# Patient Record
Sex: FEMALE | Race: WHITE | NOT HISPANIC OR LATINO | Employment: FULL TIME | URBAN - METROPOLITAN AREA
[De-identification: names, ages, dates, MRNs, and addresses within clinical notes are randomized per-mention and may not be internally consistent; named-entity substitution may affect disease eponyms.]

---

## 2018-02-04 ENCOUNTER — OFFICE VISIT (OUTPATIENT)
Dept: URGENT CARE | Facility: CLINIC | Age: 43
End: 2018-02-04
Payer: COMMERCIAL

## 2018-02-04 VITALS
DIASTOLIC BLOOD PRESSURE: 72 MMHG | HEART RATE: 87 BPM | TEMPERATURE: 98 F | SYSTOLIC BLOOD PRESSURE: 139 MMHG | OXYGEN SATURATION: 96 % | RESPIRATION RATE: 18 BRPM

## 2018-02-04 DIAGNOSIS — H66.93 BILATERAL OTITIS MEDIA, UNSPECIFIED OTITIS MEDIA TYPE: Primary | ICD-10-CM

## 2018-02-04 DIAGNOSIS — J01.90 ACUTE SINUSITIS, RECURRENCE NOT SPECIFIED, UNSPECIFIED LOCATION: ICD-10-CM

## 2018-02-04 PROCEDURE — 99203 OFFICE O/P NEW LOW 30 MIN: CPT | Performed by: NURSE PRACTITIONER

## 2018-02-04 PROCEDURE — 99213 OFFICE O/P EST LOW 20 MIN: CPT | Performed by: NURSE PRACTITIONER

## 2018-02-04 RX ORDER — AMOXICILLIN AND CLAVULANATE POTASSIUM 875; 125 MG/1; MG/1
1 TABLET, FILM COATED ORAL 2 TIMES DAILY
Qty: 14 TABLET | Refills: 0 | Status: SHIPPED | OUTPATIENT
Start: 2018-02-04 | End: 2018-02-11

## 2018-02-04 NOTE — PROGRESS NOTES
Assessment/Plan     Diagnoses and all orders for this visit:    Bilateral otitis media, unspecified otitis media type  -     amoxicillin-clavulanate (AUGMENTIN) 875-125 mg per tablet; Take 1 tablet by mouth 2 (two) times a day for 7 days    Acute sinusitis, recurrence not specified, unspecified location  -     amoxicillin-clavulanate (AUGMENTIN) 875-125 mg per tablet; Take 1 tablet by mouth 2 (two) times a day for 7 days              Subjective:     Patient ID: Ambrose Long is a 38 y o  female  Chief Complaint:    Earache    There is pain in the left ear  Episode onset: 4 days  The problem has been gradually worsening  There has been no fever  Associated symptoms include coughing, headaches and rhinorrhea  Pertinent negatives include no sore throat  No past medical history on file  No past surgical history on file  No family history on file  Review of Systems   Constitutional: Negative  HENT: Positive for congestion, ear pain and rhinorrhea  Negative for sore throat  Eyes: Negative  Respiratory: Positive for cough  Cardiovascular: Negative  Gastrointestinal: Negative  Genitourinary: Negative  Musculoskeletal: Negative  Skin: Negative  Neurological: Positive for headaches  Objective:    /72   Pulse 87   Temp 98 °F (36 7 °C)   Resp 18   SpO2 96%     Physical Exam   Constitutional: She is oriented to person, place, and time  She appears well-developed and well-nourished  No distress  HENT:   Head: Normocephalic and atraumatic  Right Ear: Tympanic membrane is erythematous and bulging  Left Ear: Tympanic membrane is erythematous and bulging  Nose: Mucosal edema and rhinorrhea present  Mouth/Throat: No oropharyngeal exudate or posterior oropharyngeal erythema  Eyes: Conjunctivae and EOM are normal  Pupils are equal, round, and reactive to light  Neck: Normal range of motion  Neck supple     Cardiovascular: Normal rate, regular rhythm and normal heart sounds  Pulmonary/Chest: Effort normal and breath sounds normal  She has no wheezes (RLL   Mild expiratory  )  Abdominal: Soft  Bowel sounds are normal    Lymphadenopathy:     She has no cervical adenopathy  Neurological: She is alert and oriented to person, place, and time  She has normal reflexes  Skin: Skin is warm and dry  No rash noted  Psychiatric: She has a normal mood and affect  Nursing note and vitals reviewed  Patient Instructions   Follow up with PCP in 48-72 hours if no improvement or if symptoms worsen

## 2021-01-28 ENCOUNTER — APPOINTMENT (EMERGENCY)
Dept: RADIOLOGY | Facility: HOSPITAL | Age: 46
End: 2021-01-28
Payer: OTHER MISCELLANEOUS

## 2021-01-28 ENCOUNTER — HOSPITAL ENCOUNTER (EMERGENCY)
Facility: HOSPITAL | Age: 46
Discharge: HOME | End: 2021-01-28
Attending: EMERGENCY MEDICINE
Payer: OTHER MISCELLANEOUS

## 2021-01-28 VITALS
RESPIRATION RATE: 18 BRPM | WEIGHT: 190 LBS | BODY MASS INDEX: 28.79 KG/M2 | HEART RATE: 80 BPM | SYSTOLIC BLOOD PRESSURE: 142 MMHG | HEIGHT: 68 IN | TEMPERATURE: 97.9 F | DIASTOLIC BLOOD PRESSURE: 88 MMHG | OXYGEN SATURATION: 98 %

## 2021-01-28 DIAGNOSIS — M25.571 ACUTE RIGHT ANKLE PAIN: Primary | ICD-10-CM

## 2021-01-28 PROCEDURE — 73610 X-RAY EXAM OF ANKLE: CPT

## 2021-01-28 PROCEDURE — 99283 EMERGENCY DEPT VISIT LOW MDM: CPT

## 2021-01-28 PROCEDURE — 99284 EMERGENCY DEPT VISIT MOD MDM: CPT | Performed by: EMERGENCY MEDICINE

## 2021-01-28 NOTE — ED ATTENDING ATTESTATION
1/28/2021  IMary Ann MD, saw and evaluated the patient  I have discussed the patient with the resident/non-physician practitioner and agree with the resident's/non-physician practitioner's findings, Plan of Care, and MDM as documented in the resident's/non-physician practitioner's note, except where noted  All available labs and Radiology studies were reviewed  I was present for key portions of any procedure(s) performed by the resident/non-physician practitioner and I was immediately available to provide assistance  At this point I agree with the current assessment done in the Emergency Department  I have conducted an independent evaluation of this patient a history and physical is as follows:    ED Course         Critical Care Time  Procedures    38 yo female with no pmh, had a fall today while trying to catch the ups betty and inverted right foot on steps  No other trauma, no other injury  Pt able to ambulate  Pt with swelling of foot  No loc  No previous injury to foot  Vss, afebrile, lungs cta, rrr, abdomen soft nontender, right lateral malleolar tenderness, nvi  Pt able to bear weight  Xray   Likely sprain, air cast

## 2021-01-28 NOTE — DISCHARGE INSTRUCTIONS
RICE therapy: Rest, Ice, Compression, and Elevation  Rest and elevate the affected painful area  Apply cold compresses intermittently as needed  You may apply a compressive ACE bandage  As pain recedes, slowly resume normal activities as tolerated  You may take ibuprofen (Motrin, Aleve) 400-600 mg and acetaminophen (Tylenol) 650 mg every 6-8 hours as needed for pain  Please follow up with a primary care provider

## 2021-01-29 NOTE — ED PROVIDER NOTES
History  Chief Complaint   Patient presents with    Ankle Injury     Patient reports right ankle pain after tripping and falling today; states that she did not hit her head or LOC; feels like the ankle is swelling but is able to ambualte at this time      44-year-old female no significant past medical history presents with right ankle pain  Patient works for [employer], states that she had mechanical ground level fall where she tripped over concrete divit  There was no prodrome prior to the fall  Twisted her right ankle with inversion on the way down fell to her knees without head strike  Felt immediate pain at the ankle and noted swelling not long after  She came in for evaluation to make sure that she did not sustain a fracture  The pain is at the lateral aspect of the right ankle, worse with contact and movement however she has been able to bear weight without difficulty  Denies any focal deficits, any numbness or tingling  Denies any history of hardware in the ankle  Does not take any blood thinners  None       History reviewed  No pertinent past medical history  History reviewed  No pertinent surgical history  History reviewed  No pertinent family history  I have reviewed and agree with the history as documented  E-Cigarette/Vaping    E-Cigarette Use Never User      E-Cigarette/Vaping Substances     Social History     Tobacco Use    Smoking status: Never Smoker    Smokeless tobacco: Never Used   Substance Use Topics    Alcohol use: Never     Frequency: Never    Drug use: Never        Review of Systems   Constitutional: Negative for chills and fever  HENT: Negative for ear pain, sinus pain and sore throat  Eyes: Negative for pain  Respiratory: Negative for shortness of breath  Cardiovascular: Negative for chest pain  Gastrointestinal: Negative for abdominal pain, diarrhea, nausea and vomiting     Genitourinary: Negative for difficulty urinating and flank pain  Musculoskeletal: Positive for arthralgias  Negative for back pain and neck pain  Neurological: Negative for headaches  All other systems reviewed and are negative  Physical Exam  ED Triage Vitals [01/28/21 1338]   Temperature Pulse Respirations Blood Pressure SpO2   97 9 °F (36 6 °C) 80 18 142/88 98 %      Temp Source Heart Rate Source Patient Position - Orthostatic VS BP Location FiO2 (%)   Oral Monitor Sitting Left arm --      Pain Score       8             Orthostatic Vital Signs  Vitals:    01/28/21 1338   BP: 142/88   Pulse: 80   Patient Position - Orthostatic VS: Sitting       Physical Exam  Vitals signs and nursing note reviewed  Constitutional:       General: She is not in acute distress  Appearance: Normal appearance  She is well-developed  She is not ill-appearing, toxic-appearing or diaphoretic  HENT:      Head: Normocephalic  Nose: Nose normal    Eyes:      General:         Right eye: No discharge  Left eye: No discharge  Conjunctiva/sclera: Conjunctivae normal    Neck:      Musculoskeletal: Normal range of motion and neck supple  No neck rigidity  Cardiovascular:      Rate and Rhythm: Normal rate  Pulses: Normal pulses  Pulmonary:      Effort: Pulmonary effort is normal  No respiratory distress  Breath sounds: Normal breath sounds  No stridor  Abdominal:      General: There is no distension  Palpations: Abdomen is soft  Tenderness: There is no abdominal tenderness  There is no guarding or rebound  Musculoskeletal:         General: Swelling and tenderness present  Comments: Swelling and ttp over right lateral malleolus  Palpable DP, PT, digits are warm, sensate and mobile with rapid cap refill  Mild abrasion over right hand volar aspect without any ttp over wrist/hand including anatomic snuffbox   Skin:     General: Skin is warm and dry  Capillary Refill: Capillary refill takes less than 2 seconds     Neurological: General: No focal deficit present  Mental Status: She is alert and oriented to person, place, and time  Cranial Nerves: No cranial nerve deficit  Motor: No weakness  Psychiatric:         Mood and Affect: Mood normal          Behavior: Behavior normal          Thought Content: Thought content normal          Judgment: Judgment normal       Comments: Pleasant, cooperative         ED Medications  Medications - No data to display    Diagnostic Studies  Results Reviewed     None                 XR ankle 3+ views RIGHT   ED Interpretation by Axel Young MD (01/28 1529)   Possible small cortical defect at the inferior lateral malleolus that would not   Formal read noted and appreciated  Final Result by Kyle Ornelas MD (01/28 1521)      No acute osseous abnormality  Workstation performed: [id number]               Procedures  Procedures      ED Course                                       MDM  Number of Diagnoses or Management Options  Acute right ankle pain:   Diagnosis management comments: Patient declines pain medication, will take motrin and tylenol at home  RICE therapy, air cast   PCP follow-up in about a week, return precautions discussed  May benefit from PT after acute swelling and pain subsides  Patient appreciative and in agreement with plan  Disposition  Final diagnoses:   Acute right ankle pain     Time reflects when diagnosis was documented in both MDM as applicable and the Disposition within this note     Time User Action Codes Description Comment    1/28/2021  3:56 PM René Riley Add [J12 [id number]] Acute right ankle pain       ED Disposition     ED Disposition Condition Date/Time Comment    Discharge Stable Thu Jan 28, 2021  3:56 PM Janeece Harada discharge to home/self care              Follow-up Information     Follow up With Specialties Details Why Contact Info Additional Information    Antonia Cooley MD Family Medicine Schedule an appointment as soon as possible for a visit in 1 week For follow up regarding your symptoms [address] [address] [place] [phone]       [address] Emergency Department Emergency Medicine Go to  If symptoms worsen [address]  [address] [facility] 64 [facility] Emergency Department, [address], [address] [phone]          There are no discharge medications for this patient  No discharge procedures on file  PDMP Review     None           ED Provider  Attending physically available and evaluated Laine Laws  I managed the patient along with the ED Attending      Electronically Signed by

## 2021-03-30 DIAGNOSIS — Z23 ENCOUNTER FOR IMMUNIZATION: ICD-10-CM
